# Patient Record
Sex: FEMALE | URBAN - METROPOLITAN AREA
[De-identification: names, ages, dates, MRNs, and addresses within clinical notes are randomized per-mention and may not be internally consistent; named-entity substitution may affect disease eponyms.]

---

## 2020-12-02 ENCOUNTER — APPOINTMENT (OUTPATIENT)
Dept: URBAN - METROPOLITAN AREA CLINIC 211 | Age: 50
Setting detail: DERMATOLOGY
End: 2020-12-05

## 2020-12-02 DIAGNOSIS — Z71.89 OTHER SPECIFIED COUNSELING: ICD-10-CM

## 2020-12-02 DIAGNOSIS — D22 MELANOCYTIC NEVI: ICD-10-CM

## 2020-12-02 PROBLEM — D22.4 MELANOCYTIC NEVI OF SCALP AND NECK: Status: ACTIVE | Noted: 2020-12-02

## 2020-12-02 PROCEDURE — OTHER MIPS QUALITY: OTHER

## 2020-12-02 PROCEDURE — OTHER COUNSELING: OTHER

## 2020-12-02 PROCEDURE — 99202 OFFICE O/P NEW SF 15 MIN: CPT

## 2020-12-02 PROCEDURE — OTHER REASSURANCE: OTHER

## 2020-12-02 PROCEDURE — OTHER TREATMENT REGIMEN: OTHER

## 2020-12-02 ASSESSMENT — LOCATION ZONE DERM
LOCATION ZONE: TRUNK
LOCATION ZONE: NECK

## 2020-12-02 ASSESSMENT — LOCATION SIMPLE DESCRIPTION DERM
LOCATION SIMPLE: CHEST
LOCATION SIMPLE: LEFT ANTERIOR NECK

## 2020-12-02 ASSESSMENT — LOCATION DETAILED DESCRIPTION DERM
LOCATION DETAILED: LEFT CLAVICULAR NECK
LOCATION DETAILED: UPPER STERNUM

## 2023-08-29 ENCOUNTER — APPOINTMENT (OUTPATIENT)
Dept: URBAN - METROPOLITAN AREA CLINIC 211 | Age: 53
Setting detail: DERMATOLOGY
End: 2023-08-29

## 2023-08-29 DIAGNOSIS — L65.0 TELOGEN EFFLUVIUM: ICD-10-CM

## 2023-08-29 PROCEDURE — OTHER DIAGNOSIS COMMENT: OTHER

## 2023-08-29 PROCEDURE — OTHER COUNSELING: OTHER

## 2023-08-29 PROCEDURE — OTHER TREATMENT REGIMEN: OTHER

## 2023-08-29 PROCEDURE — OTHER MIPS QUALITY: OTHER

## 2023-08-29 PROCEDURE — 99213 OFFICE O/P EST LOW 20 MIN: CPT

## 2023-08-29 ASSESSMENT — LOCATION ZONE DERM: LOCATION ZONE: SCALP

## 2023-08-29 ASSESSMENT — LOCATION DETAILED DESCRIPTION DERM: LOCATION DETAILED: LEFT MEDIAL FRONTAL SCALP

## 2023-08-29 ASSESSMENT — LOCATION SIMPLE DESCRIPTION DERM: LOCATION SIMPLE: LEFT SCALP

## 2023-08-29 NOTE — HPI: HAIR LOSS
Previous Labs: Yes
How Did The Hair Loss Occur?: sudden in onset
How Severe Is Your Hair Loss?: severe
When Were The Labs Drawn? (Drawn...): Last month
Lab Details: Thyroid

## 2023-08-29 NOTE — PROCEDURE: COUNSELING
Minoxidil Recommendations: 5% solution or foam nightly (does not matter if brand name, men's vs women's) - available OTC
Detail Level: Detailed

## 2023-08-29 NOTE — PROCEDURE: DIAGNOSIS COMMENT
Comment: favor TE, but consider bx if progression or to rule out other cause
Detail Level: Simple
Render Risk Assessment In Note?: no

## 2023-08-29 NOTE — PROCEDURE: TREATMENT REGIMEN
Plan: Hair loss:\\n1. Reassurance, discussed hair loss can take many months to a year to improve \\n2. Recommend starting topical minoxidil daily — pt declines at this time\\n3. Basic labwork to r/o anemia, iron/vit D deficiency, thyroid - done, thyroid being monitored/adjusted\\n4. May consider continuing Nutrafol — has been on for a few weeks\\n5. Discussed possible scalp bx if worsening, not improving or wants confirmation of diagnosis
Detail Level: Zone